# Patient Record
Sex: FEMALE | Race: WHITE | HISPANIC OR LATINO | URBAN - METROPOLITAN AREA
[De-identification: names, ages, dates, MRNs, and addresses within clinical notes are randomized per-mention and may not be internally consistent; named-entity substitution may affect disease eponyms.]

---

## 2017-09-17 ENCOUNTER — EMERGENCY (EMERGENCY)
Facility: HOSPITAL | Age: 42
LOS: 1 days | Discharge: HOME | End: 2017-09-17
Attending: EMERGENCY MEDICINE | Admitting: EMERGENCY MEDICINE
Payer: MEDICAID

## 2017-09-17 VITALS
HEART RATE: 87 BPM | OXYGEN SATURATION: 100 % | SYSTOLIC BLOOD PRESSURE: 136 MMHG | TEMPERATURE: 98 F | DIASTOLIC BLOOD PRESSURE: 91 MMHG | RESPIRATION RATE: 18 BRPM

## 2017-09-17 PROCEDURE — 99284 EMERGENCY DEPT VISIT MOD MDM: CPT | Mod: 25

## 2017-09-17 NOTE — ED ADULT TRIAGE NOTE - CHIEF COMPLAINT QUOTE
Pt c/o sharp intermittent ear pain in Left ear, x 3 days, tender and slightly swollen behind ear and along throat;  afebrile, denies sore throat, no recent dental pain; no recent illness; pt decided to start taking some old abx from home on Friday - she believes they were Amoxicillin,

## 2017-09-18 LAB
ALBUMIN SERPL ELPH-MCNC: 4.3 G/DL — SIGNIFICANT CHANGE UP (ref 3.3–5)
ALP SERPL-CCNC: 54 U/L — SIGNIFICANT CHANGE UP (ref 40–120)
ALT FLD-CCNC: 41 U/L — HIGH (ref 4–33)
AST SERPL-CCNC: 38 U/L — HIGH (ref 4–32)
BASOPHILS # BLD AUTO: 0.05 K/UL — SIGNIFICANT CHANGE UP (ref 0–0.2)
BASOPHILS NFR BLD AUTO: 0.5 % — SIGNIFICANT CHANGE UP (ref 0–2)
BILIRUB SERPL-MCNC: 0.2 MG/DL — SIGNIFICANT CHANGE UP (ref 0.2–1.2)
BUN SERPL-MCNC: 19 MG/DL — SIGNIFICANT CHANGE UP (ref 7–23)
CALCIUM SERPL-MCNC: 9.2 MG/DL — SIGNIFICANT CHANGE UP (ref 8.4–10.5)
CHLORIDE SERPL-SCNC: 100 MMOL/L — SIGNIFICANT CHANGE UP (ref 98–107)
CO2 SERPL-SCNC: 27 MMOL/L — SIGNIFICANT CHANGE UP (ref 22–31)
CREAT SERPL-MCNC: 0.86 MG/DL — SIGNIFICANT CHANGE UP (ref 0.5–1.3)
EOSINOPHIL # BLD AUTO: 0.22 K/UL — SIGNIFICANT CHANGE UP (ref 0–0.5)
EOSINOPHIL NFR BLD AUTO: 2.1 % — SIGNIFICANT CHANGE UP (ref 0–6)
GLUCOSE SERPL-MCNC: 128 MG/DL — HIGH (ref 70–99)
HCT VFR BLD CALC: 44.7 % — SIGNIFICANT CHANGE UP (ref 34.5–45)
HGB BLD-MCNC: 14.9 G/DL — SIGNIFICANT CHANGE UP (ref 11.5–15.5)
IMM GRANULOCYTES # BLD AUTO: 0.02 # — SIGNIFICANT CHANGE UP
IMM GRANULOCYTES NFR BLD AUTO: 0.2 % — SIGNIFICANT CHANGE UP (ref 0–1.5)
LYMPHOCYTES # BLD AUTO: 3.57 K/UL — HIGH (ref 1–3.3)
LYMPHOCYTES # BLD AUTO: 34.6 % — SIGNIFICANT CHANGE UP (ref 13–44)
MCHC RBC-ENTMCNC: 30.2 PG — SIGNIFICANT CHANGE UP (ref 27–34)
MCHC RBC-ENTMCNC: 33.3 % — SIGNIFICANT CHANGE UP (ref 32–36)
MCV RBC AUTO: 90.5 FL — SIGNIFICANT CHANGE UP (ref 80–100)
MONOCYTES # BLD AUTO: 0.92 K/UL — HIGH (ref 0–0.9)
MONOCYTES NFR BLD AUTO: 8.9 % — SIGNIFICANT CHANGE UP (ref 2–14)
NEUTROPHILS # BLD AUTO: 5.54 K/UL — SIGNIFICANT CHANGE UP (ref 1.8–7.4)
NEUTROPHILS NFR BLD AUTO: 53.7 % — SIGNIFICANT CHANGE UP (ref 43–77)
NRBC # FLD: 0 — SIGNIFICANT CHANGE UP
PLATELET # BLD AUTO: 266 K/UL — SIGNIFICANT CHANGE UP (ref 150–400)
PMV BLD: 11.2 FL — SIGNIFICANT CHANGE UP (ref 7–13)
POTASSIUM SERPL-MCNC: 4.5 MMOL/L — SIGNIFICANT CHANGE UP (ref 3.5–5.3)
POTASSIUM SERPL-SCNC: 4.5 MMOL/L — SIGNIFICANT CHANGE UP (ref 3.5–5.3)
PROT SERPL-MCNC: 8.3 G/DL — SIGNIFICANT CHANGE UP (ref 6–8.3)
RBC # BLD: 4.94 M/UL — SIGNIFICANT CHANGE UP (ref 3.8–5.2)
RBC # FLD: 12.1 % — SIGNIFICANT CHANGE UP (ref 10.3–14.5)
SODIUM SERPL-SCNC: 141 MMOL/L — SIGNIFICANT CHANGE UP (ref 135–145)
WBC # BLD: 10.32 K/UL — SIGNIFICANT CHANGE UP (ref 3.8–10.5)
WBC # FLD AUTO: 10.32 K/UL — SIGNIFICANT CHANGE UP (ref 3.8–10.5)

## 2017-09-18 RX ORDER — CIPROFLOXACIN AND DEXAMETHASONE 3; 1 MG/ML; MG/ML
4 SUSPENSION/ DROPS AURICULAR (OTIC) ONCE
Qty: 0 | Refills: 0 | Status: DISCONTINUED | OUTPATIENT
Start: 2017-09-18 | End: 2017-09-21

## 2017-09-18 NOTE — ED PROVIDER NOTE - OBJECTIVE STATEMENT
41 y/o female with no significant PMHx presents to the ER c/o left inner ear pain and posterior left ear pain x3days. Pt states sx have been worsening over the past few days. Pt reports decreased hearing to the left ear. Pt denies fever, chills, nausea, vomiting, discharge, trauma. Pt offered and declined translation services, requesting her son Domingo Alexandre. 43 y/o female with no significant PMHx presents to the ER c/o left outer and posterior ear pain x3days. Pt states sx have been worsening over the past few days. Pt reports decreased hearing to the left ear. Pt denies fever, chills, nausea, vomiting, discharge, trauma. Pt offered and declined translation services, requesting her son Domingo Alexandre to translate. 41 y/o female with no significant PMHx presents to the ER c/o left outer and posterior ear pain x3days. Pt states sx have been worsening over the past few days. Pt reports decreased hearing to the left ear. Pt denies fever, chills, nausea, vomiting, discharge, trauma. Pt offered and declined translation services, requesting her son Domingo Alexandre to translate.    Arlene att: 42F neg pmh c/o left ear pain x 3 days. Patient declined [place]  x 2, request son translate. Past 3 days patient developed worsening ear pain, points to left ear, associated decr hearing. Denies f, c, dm, n, v, discharge, trauma, recent abx.

## 2017-09-18 NOTE — ED PROVIDER NOTE - CHPI ED SYMPTOMS POS
left inner ear pain, posterior left ear pain, decreased hearing/EAR PAIN left outer ear pain, posterior ear pain, decreased hearing/EAR PAIN

## 2017-09-18 NOTE — ED PROVIDER NOTE - ATTENDING CONTRIBUTION TO CARE
Dr. Jacobs: I performed a face to face bedside interview with patient regarding history of present illness, review of symptoms and past medical history. I completed an independent physical exam.  I have discussed patient's plan of care with PA.   I agree with note as stated above, having amended the EMR as needed to reflect my findings.   This includes HISTORY OF PRESENT ILLNESS, HIV, PAST MEDICAL/SURGICAL/FAMILY/SOCIAL HISTORY, ALLERGIES AND HOME MEDICATIONS, REVIEW OF SYSTEMS, PHYSICAL EXAM, and any PROGRESS NOTES during the time I functioned as the attending physician for this patient. HPI above as by me. PE above as by me. DDX otitis externa PLAN ciprodex and ent f/up outpatient.

## 2017-09-18 NOTE — ED PROVIDER NOTE - LEFT EAR
swelling of the external auricular canal, tenderness over the mastoid, TM is clear/TM clear swelling of the external auricular canal, TM is clear. +pre and post articular lymph nodes, non tender over mastoid./TM clear

## 2017-09-18 NOTE — ED PROVIDER NOTE - CROS ED ENMT EARS POS
left inner/posterior ear pain, decreased hearing to the left ear left outer/posterior ear pain, decreased hearing to the left ear

## 2017-09-18 NOTE — ED PROVIDER NOTE - PSH
S/P breast augmentation    S/P breast biopsy, left  cyst  S/P hemorrhoidectomy    S/P hysterectomy  2012 secondary to fibroids  S/P tonsillectomy

## 2017-09-18 NOTE — ED PROVIDER NOTE - MEDICAL DECISION MAKING DETAILS
43 y/o female presents with x3days of left ear pain with tenderness over the mastoid. Plan - will check labs, ct to r/o mastoiditis. 41 y/o female presents with x3days of left ear pain with tenderness over the mastoid. Plan - will check labs, likely otitis externa, Ciprodex, follow up PMD. 43 y/o female presents with 3 days of left ear pain.  Plan - will check labs, likely otitis externa, Ciprodex, follow up PMD.

## 2017-09-18 NOTE — ED PROVIDER NOTE - PHYSICAL EXAMINATION
Arlene att: nad, aaox3, lt ear erythematous canal, pulling of pinna elicits pain, neg internal debris, TM intact without bulging, ctabl, rrr, s1s2, abd soft ntnd, neg le edema

## 2017-09-18 NOTE — ED PROVIDER NOTE - PLAN OF CARE
Follow up with your Doctor in 1-2 days.  Ciprodex Otic Drops 4 drops to left ear 2 x a day x 7 days.  Take Motrin 400mg orally every 6 hours as needed for pain take with food.  Return to the ER for any persistent/worsening or new symptoms fevers, chills, increased pain or any concerning symptoms.

## 2017-09-18 NOTE — ED PROVIDER NOTE - CARE PLAN
Principal Discharge DX:	Otitis externa  Instructions for follow-up, activity and diet:	Follow up with your Doctor in 1-2 days.  Ciprodex Otic Drops 4 drops to left ear 2 x a day x 7 days.  Take Motrin 400mg orally every 6 hours as needed for pain take with food.  Return to the ER for any persistent/worsening or new symptoms fevers, chills, increased pain or any concerning symptoms.

## 2017-09-19 ENCOUNTER — EMERGENCY (EMERGENCY)
Facility: HOSPITAL | Age: 42
LOS: 1 days | Discharge: HOME | End: 2017-09-19
Attending: EMERGENCY MEDICINE | Admitting: EMERGENCY MEDICINE
Payer: MEDICAID

## 2017-09-19 VITALS
TEMPERATURE: 98 F | RESPIRATION RATE: 20 BRPM | HEART RATE: 84 BPM | OXYGEN SATURATION: 100 % | SYSTOLIC BLOOD PRESSURE: 148 MMHG | DIASTOLIC BLOOD PRESSURE: 83 MMHG

## 2017-09-19 DIAGNOSIS — H60.312 DIFFUSE OTITIS EXTERNA, LEFT EAR: ICD-10-CM

## 2017-09-19 LAB
ALBUMIN SERPL ELPH-MCNC: 4.4 G/DL — SIGNIFICANT CHANGE UP (ref 3.3–5)
ALP SERPL-CCNC: 51 U/L — SIGNIFICANT CHANGE UP (ref 40–120)
ALT FLD-CCNC: 32 U/L — SIGNIFICANT CHANGE UP (ref 4–33)
AST SERPL-CCNC: 18 U/L — SIGNIFICANT CHANGE UP (ref 4–32)
BASOPHILS # BLD AUTO: 0.05 K/UL — SIGNIFICANT CHANGE UP (ref 0–0.2)
BASOPHILS NFR BLD AUTO: 0.5 % — SIGNIFICANT CHANGE UP (ref 0–2)
BILIRUB SERPL-MCNC: 0.4 MG/DL — SIGNIFICANT CHANGE UP (ref 0.2–1.2)
BUN SERPL-MCNC: 12 MG/DL — SIGNIFICANT CHANGE UP (ref 7–23)
CALCIUM SERPL-MCNC: 9.2 MG/DL — SIGNIFICANT CHANGE UP (ref 8.4–10.5)
CHLORIDE SERPL-SCNC: 101 MMOL/L — SIGNIFICANT CHANGE UP (ref 98–107)
CO2 SERPL-SCNC: 28 MMOL/L — SIGNIFICANT CHANGE UP (ref 22–31)
CREAT SERPL-MCNC: 0.66 MG/DL — SIGNIFICANT CHANGE UP (ref 0.5–1.3)
EOSINOPHIL # BLD AUTO: 0.17 K/UL — SIGNIFICANT CHANGE UP (ref 0–0.5)
EOSINOPHIL NFR BLD AUTO: 1.5 % — SIGNIFICANT CHANGE UP (ref 0–6)
GLUCOSE SERPL-MCNC: 120 MG/DL — HIGH (ref 70–99)
HCG SERPL-ACNC: < 5 MIU/ML — SIGNIFICANT CHANGE UP
HCT VFR BLD CALC: 44.1 % — SIGNIFICANT CHANGE UP (ref 34.5–45)
HGB BLD-MCNC: 14.8 G/DL — SIGNIFICANT CHANGE UP (ref 11.5–15.5)
IMM GRANULOCYTES # BLD AUTO: 0.05 # — SIGNIFICANT CHANGE UP
IMM GRANULOCYTES NFR BLD AUTO: 0.5 % — SIGNIFICANT CHANGE UP (ref 0–1.5)
LYMPHOCYTES # BLD AUTO: 28.3 % — SIGNIFICANT CHANGE UP (ref 13–44)
LYMPHOCYTES # BLD AUTO: 3.12 K/UL — SIGNIFICANT CHANGE UP (ref 1–3.3)
MCHC RBC-ENTMCNC: 29.8 PG — SIGNIFICANT CHANGE UP (ref 27–34)
MCHC RBC-ENTMCNC: 33.6 % — SIGNIFICANT CHANGE UP (ref 32–36)
MCV RBC AUTO: 88.7 FL — SIGNIFICANT CHANGE UP (ref 80–100)
MONOCYTES # BLD AUTO: 0.88 K/UL — SIGNIFICANT CHANGE UP (ref 0–0.9)
MONOCYTES NFR BLD AUTO: 8 % — SIGNIFICANT CHANGE UP (ref 2–14)
NEUTROPHILS # BLD AUTO: 6.76 K/UL — SIGNIFICANT CHANGE UP (ref 1.8–7.4)
NEUTROPHILS NFR BLD AUTO: 61.2 % — SIGNIFICANT CHANGE UP (ref 43–77)
NRBC # FLD: 0 — SIGNIFICANT CHANGE UP
PLATELET # BLD AUTO: 314 K/UL — SIGNIFICANT CHANGE UP (ref 150–400)
PMV BLD: 10.8 FL — SIGNIFICANT CHANGE UP (ref 7–13)
POTASSIUM SERPL-MCNC: 4.2 MMOL/L — SIGNIFICANT CHANGE UP (ref 3.5–5.3)
POTASSIUM SERPL-SCNC: 4.2 MMOL/L — SIGNIFICANT CHANGE UP (ref 3.5–5.3)
PROT SERPL-MCNC: 8.2 G/DL — SIGNIFICANT CHANGE UP (ref 6–8.3)
RBC # BLD: 4.97 M/UL — SIGNIFICANT CHANGE UP (ref 3.8–5.2)
RBC # FLD: 12 % — SIGNIFICANT CHANGE UP (ref 10.3–14.5)
SODIUM SERPL-SCNC: 141 MMOL/L — SIGNIFICANT CHANGE UP (ref 135–145)
WBC # BLD: 11.03 K/UL — HIGH (ref 3.8–10.5)
WBC # FLD AUTO: 11.03 K/UL — HIGH (ref 3.8–10.5)

## 2017-09-19 PROCEDURE — 99220: CPT

## 2017-09-19 PROCEDURE — 70487 CT MAXILLOFACIAL W/DYE: CPT | Mod: 26

## 2017-09-19 RX ORDER — PIPERACILLIN AND TAZOBACTAM 4; .5 G/20ML; G/20ML
3.38 INJECTION, POWDER, LYOPHILIZED, FOR SOLUTION INTRAVENOUS ONCE
Qty: 0 | Refills: 0 | Status: COMPLETED | OUTPATIENT
Start: 2017-09-19 | End: 2017-09-19

## 2017-09-19 RX ORDER — SODIUM CHLORIDE 9 MG/ML
1000 INJECTION INTRAMUSCULAR; INTRAVENOUS; SUBCUTANEOUS ONCE
Qty: 0 | Refills: 0 | Status: COMPLETED | OUTPATIENT
Start: 2017-09-19 | End: 2017-09-19

## 2017-09-19 RX ORDER — IBUPROFEN 200 MG
600 TABLET ORAL ONCE
Qty: 0 | Refills: 0 | Status: COMPLETED | OUTPATIENT
Start: 2017-09-19 | End: 2017-09-19

## 2017-09-19 RX ORDER — MORPHINE SULFATE 50 MG/1
4 CAPSULE, EXTENDED RELEASE ORAL ONCE
Qty: 0 | Refills: 0 | Status: DISCONTINUED | OUTPATIENT
Start: 2017-09-19 | End: 2017-09-19

## 2017-09-19 RX ORDER — VANCOMYCIN HCL 1 G
1000 VIAL (EA) INTRAVENOUS ONCE
Qty: 0 | Refills: 0 | Status: COMPLETED | OUTPATIENT
Start: 2017-09-19 | End: 2017-09-19

## 2017-09-19 RX ORDER — CIPROFLOXACIN AND DEXAMETHASONE 3; 1 MG/ML; MG/ML
4 SUSPENSION/ DROPS AURICULAR (OTIC) ONCE
Qty: 0 | Refills: 0 | Status: COMPLETED | OUTPATIENT
Start: 2017-09-19 | End: 2017-09-19

## 2017-09-19 RX ORDER — AMPICILLIN SODIUM AND SULBACTAM SODIUM 250; 125 MG/ML; MG/ML
3 INJECTION, POWDER, FOR SUSPENSION INTRAMUSCULAR; INTRAVENOUS ONCE
Qty: 0 | Refills: 0 | Status: COMPLETED | OUTPATIENT
Start: 2017-09-19 | End: 2017-09-19

## 2017-09-19 RX ORDER — AMPICILLIN SODIUM AND SULBACTAM SODIUM 250; 125 MG/ML; MG/ML
3 INJECTION, POWDER, FOR SUSPENSION INTRAMUSCULAR; INTRAVENOUS EVERY 6 HOURS
Qty: 0 | Refills: 0 | Status: DISCONTINUED | OUTPATIENT
Start: 2017-09-19 | End: 2017-09-23

## 2017-09-19 RX ADMIN — Medication 250 MILLIGRAM(S): at 13:04

## 2017-09-19 RX ADMIN — PIPERACILLIN AND TAZOBACTAM 200 GRAM(S): 4; .5 INJECTION, POWDER, LYOPHILIZED, FOR SOLUTION INTRAVENOUS at 12:13

## 2017-09-19 RX ADMIN — AMPICILLIN SODIUM AND SULBACTAM SODIUM 200 GRAM(S): 250; 125 INJECTION, POWDER, FOR SUSPENSION INTRAMUSCULAR; INTRAVENOUS at 07:40

## 2017-09-19 RX ADMIN — CIPROFLOXACIN AND DEXAMETHASONE 4 DROP(S): 3; 1 SUSPENSION/ DROPS AURICULAR (OTIC) at 07:40

## 2017-09-19 RX ADMIN — MORPHINE SULFATE 4 MILLIGRAM(S): 50 CAPSULE, EXTENDED RELEASE ORAL at 20:10

## 2017-09-19 RX ADMIN — SODIUM CHLORIDE 1000 MILLILITER(S): 9 INJECTION INTRAMUSCULAR; INTRAVENOUS; SUBCUTANEOUS at 07:40

## 2017-09-19 RX ADMIN — MORPHINE SULFATE 4 MILLIGRAM(S): 50 CAPSULE, EXTENDED RELEASE ORAL at 19:54

## 2017-09-19 RX ADMIN — Medication 600 MILLIGRAM(S): at 17:40

## 2017-09-19 RX ADMIN — Medication 600 MILLIGRAM(S): at 17:10

## 2017-09-19 RX ADMIN — AMPICILLIN SODIUM AND SULBACTAM SODIUM 200 GRAM(S): 250; 125 INJECTION, POWDER, FOR SUSPENSION INTRAMUSCULAR; INTRAVENOUS at 22:34

## 2017-09-19 NOTE — ED PROVIDER NOTE - ATTENDING CONTRIBUTION TO CARE
Elías GAMBLE- 43 Y/O F with no known medical problems p/w left ear pain and swelling with now decreased hearing for 1 day, pt was seen in ED few days ago and diagnosed with otitis externa and putting ear drops but getting worse, no fever, chills, nausea, vomiting , headache or tinnitus    pt is alert, well appearing female, Japanese speaking, son translating, s1s2 normal reg, b/l clear breath sounds, abd soft, nt, nd, neuro exam aox3 sub decreased hearing in left ear but has hearing capacity, left tm is dull with swollen external canal and mastoid tenderness, rt ear exam normal, skin warm, dry, good turgor    plan to r/o mastoiditis, will treat with unasyn, cipro dex drops , do ct maxillofacial with iv contrast

## 2017-09-19 NOTE — ED CDU PROVIDER NOTE - PROGRESS NOTE DETAILS
CDU THERESA GARCIA: pt examined at bedside, getting abx for mastoiditis, c/o 6/10 pain, will give her morphine, continue abx, have ent reasses in the AM, reasses her pain soon CDU Attending Dr. Harrell Discharge Note: pt signed out to me at 0700 today, 9/20/17; pt is a 41 yo female with left otomastoiditis and left otitis externa, placed in CDU for IV abx and ENT re-evaluation; on my evaluation pt noting feeling improvement, in NAD; as per ENT pt stable for outpatient abx at this time; pt stable for discharge from CDU with outpatient follow up THERESA Mendoza: Pt feeling better. ent called this morning. Pt stable for discharge, with rx for augmentin and ciprodex drops. Pt to follow up with ENT clinic at . Pt agrees with plan.

## 2017-09-19 NOTE — ED CDU PROVIDER NOTE - OBJECTIVE STATEMENT
# [id number]   41 y/o female no pmh, seen here few days ago for left ear pain, was given cipro ear drops now here c/o worsening ear pain, and swelling behind her ear,+ difficulty hearing, denies any headaches, f/c/n/v/d, tinnitus, chest pain, sob, abdomianl pain, urinary symptoms, numbness/weaknesss/tingling, recent travel, sick contact, social history    CDU THERESA Mendoza: Daughter at bedside translating, Pt refusing translation service at this time.   42 yr old female with hx of asthma presents c/o left ear pain. Pt was seen in ED few days ago and given ear drops. Pt presents today for worsening ear pain and swelling behind ear with difficulty hearing. Denies any fever, chills or any other symptoms.   CT showing left mastoiditis/ malignant otitis externa. ENT seen pt. Pt in cdu for IV abx and ENT will follow.

## 2017-09-19 NOTE — ED PROVIDER NOTE - OBJECTIVE STATEMENT
# [id number]   43 y/o female no pmh, seen here few days ago for left ear pain, was given cipro ear drops now here c/o worsening ear pain, and swelling behind her ear,+ difficulty hearing, denies any headaches, f/c/n/v/d, tinnitus, chest pain, sob, abdomianl pain, urinary symptoms, numbness/weaknesss/tingling, recent travel, sick contact, social history

## 2017-09-19 NOTE — ED ADULT NURSE NOTE - OBJECTIVE STATEMENT
Patient a&ox4, ambulatory, arrived to ed intake room 8. Patient states L sided ear pain, facial swelling,& decreased hearing. Patient recently prescribed antibiotic ear drops Sunday, states compliant with medication regimen and taking ibuprofen with pain but feels s.s are worsening. Patient states moderate pain, worse severe when laying flat. Patient evaluated by ED provider. IV placed, labs drawn and sent to lab. TO be medicated as ordered. Pending CT scan. Safety and comfort maintained. Son at bedside. Pt able to make needs known.

## 2017-09-19 NOTE — PROGRESS NOTE ADULT - SUBJECTIVE AND OBJECTIVE BOX
42 year old female that presents to the ED c/o L ear pain for about one   week. Was started on Ciprodex drops without relief. Presents to day stating the   pain now radiates to jaw and neck. Denies any fevers, chills, N/V, sweats or any   other complaints.     AVSS, P02 98%  HEENT:  L Ear: + tenderness to palpation, EAC enlarged. Canal   obstructed and tender. + erythema. No exudate.   R ear: no erthema, No edema, No exudates.  TM intact.     Placed small wick L EAC.    CT  IMPRESSION:  Left otomastoiditis.  Left otitis externa with surrounding soft tissue inflammatory change  consistent with malignant otitis externa.      Results were discussed with JAYLENE Tucker by Dr. Johnson at 10:49 AM,  9/19/2017 with read back followed.

## 2017-09-19 NOTE — ED ADULT TRIAGE NOTE - CHIEF COMPLAINT QUOTE
Pt. presents to the ED with Left ear pain. Was seen on Sunday in the ED and was prescribed ear drops. c/o ear pain persisting. Pt. is Croatian speaking and is consenting to having son translate but would like translation services for medical evaluation. Pt. appears comfortable at triage.

## 2017-09-19 NOTE — ED CDU PROVIDER NOTE - LEFT EAR
+ mastoid swelling, tenderness, limited internal exam due to wick in place- placed by ENT/EXTERNAL CANAL INFLAMMATION

## 2017-09-19 NOTE — PROGRESS NOTE ADULT - PROBLEM SELECTOR PLAN 1
1. Continue with ciprodex drops to wick  2. Start oral augmentin 875 mg bid x 7 days  3. Pain control per ED.  4. Return to ED if symptoms worsen  5. [facility] ENT resident who will [facility] ENT attending. CT was reviewed by resident and after discussing case resident gave clearance for discharge with above plan and out patient follow up ENT clinic

## 2017-09-19 NOTE — ED ADULT NURSE NOTE - CHIEF COMPLAINT QUOTE
Pt. presents to the ED with Left ear pain. Was seen on Sunday in the ED and was prescribed ear drops. c/o ear pain persisting. Pt. is Pakistani speaking and is consenting to having son translate but would like translation services for medical evaluation. Pt. appears comfortable at triage.

## 2017-09-19 NOTE — ED PROVIDER NOTE - PROGRESS NOTE DETAILS
ATTG NOTE DR. CARLSON patient evaluated, sitting comfortable, NAD, +tenderness to left sided SCM posterior belly, ENT called, CT pending results.

## 2017-09-19 NOTE — ED CDU PROVIDER NOTE - ATTENDING CONTRIBUTION TO CARE
Dr. Delgadillo: JAIDEN HINKLE MD: 41 yo F with a past medical history of asthma presents c/o LEFT ear and facial pain and swelling.  Pt was recently evaluated in the ED and d/c'ed on ear drops. Patient states  today worsening ear pain and swelling with decreased hearing. Denies any F/C or any other symptoms.   CT performed in the ED showed left mastoiditis/malignant OE.   JAIDEN REYNOSO, ATTENDING NOTE:  Patient is awake and alert and in no acute distress.  LEFT sided auricular swelling and tenderness to palpation with associated mastoid tenderness to palpation.  Airway patent.  No stridor, no oropharyngeal edema.  Neck supple.  Lungs CTAB, no wheeze, no rhonchi,  no rales.  Heart is regular rate and rhythm.  Abdomen is soft, not distended +BS.  Back is nontender, no CVAT.  Moving all 4 extremities.   Neurologically grossly intact.  Affect is appropriate.   DR. REYNOSO, ATTENDING MD-  I performed a face to face bedside interview with patient regarding history of present illness, review of symptoms and past medical history. I completed an independent physical exam.  I have discussed patient's plan of care with the PA.   I agree with note as stated above, having amended the EMR as needed to reflect my findings. I have discussed the assessment and plan of care.  This includes during the time I functioned as the attending physician for this patient.

## 2017-09-20 VITALS
HEART RATE: 82 BPM | RESPIRATION RATE: 16 BRPM | DIASTOLIC BLOOD PRESSURE: 70 MMHG | TEMPERATURE: 98 F | SYSTOLIC BLOOD PRESSURE: 110 MMHG | OXYGEN SATURATION: 98 %

## 2017-09-20 PROCEDURE — 99217: CPT

## 2017-09-20 RX ORDER — CIPROFLOXACIN AND DEXAMETHASONE 3; 1 MG/ML; MG/ML
4 SUSPENSION/ DROPS AURICULAR (OTIC)
Qty: 1 | Refills: 0 | OUTPATIENT
Start: 2017-09-20 | End: 2017-09-30

## 2017-09-20 RX ADMIN — AMPICILLIN SODIUM AND SULBACTAM SODIUM 200 GRAM(S): 250; 125 INJECTION, POWDER, FOR SUSPENSION INTRAMUSCULAR; INTRAVENOUS at 04:40

## 2017-10-26 ENCOUNTER — APPOINTMENT (OUTPATIENT)
Dept: OTOLARYNGOLOGY | Facility: CLINIC | Age: 42
End: 2017-10-26

## 2018-01-29 ENCOUNTER — EMERGENCY (EMERGENCY)
Facility: HOSPITAL | Age: 43
LOS: 1 days | Discharge: HOME | End: 2018-01-29
Attending: EMERGENCY MEDICINE | Admitting: EMERGENCY MEDICINE
Payer: MEDICAID

## 2018-01-29 VITALS
SYSTOLIC BLOOD PRESSURE: 145 MMHG | RESPIRATION RATE: 16 BRPM | TEMPERATURE: 98 F | DIASTOLIC BLOOD PRESSURE: 87 MMHG | OXYGEN SATURATION: 100 % | HEART RATE: 79 BPM

## 2018-01-29 VITALS
SYSTOLIC BLOOD PRESSURE: 150 MMHG | OXYGEN SATURATION: 100 % | HEART RATE: 75 BPM | RESPIRATION RATE: 16 BRPM | DIASTOLIC BLOOD PRESSURE: 95 MMHG | TEMPERATURE: 98 F

## 2018-01-29 LAB
ALBUMIN SERPL ELPH-MCNC: 4.4 G/DL — SIGNIFICANT CHANGE UP (ref 3.3–5)
ALP SERPL-CCNC: 48 U/L — SIGNIFICANT CHANGE UP (ref 40–120)
ALT FLD-CCNC: 36 U/L — HIGH (ref 4–33)
APPEARANCE UR: CLEAR — SIGNIFICANT CHANGE UP
APTT BLD: 27.8 SEC — SIGNIFICANT CHANGE UP (ref 27.5–37.4)
AST SERPL-CCNC: 29 U/L — SIGNIFICANT CHANGE UP (ref 4–32)
BACTERIA # UR AUTO: SIGNIFICANT CHANGE UP
BASOPHILS # BLD AUTO: 0.05 K/UL — SIGNIFICANT CHANGE UP (ref 0–0.2)
BASOPHILS NFR BLD AUTO: 0.5 % — SIGNIFICANT CHANGE UP (ref 0–2)
BILIRUB SERPL-MCNC: 0.2 MG/DL — SIGNIFICANT CHANGE UP (ref 0.2–1.2)
BILIRUB UR-MCNC: NEGATIVE — SIGNIFICANT CHANGE UP
BLD GP AB SCN SERPL QL: NEGATIVE — SIGNIFICANT CHANGE UP
BLOOD UR QL VISUAL: NEGATIVE — SIGNIFICANT CHANGE UP
BUN SERPL-MCNC: 17 MG/DL — SIGNIFICANT CHANGE UP (ref 7–23)
CALCIUM SERPL-MCNC: 9.5 MG/DL — SIGNIFICANT CHANGE UP (ref 8.4–10.5)
CHLORIDE SERPL-SCNC: 101 MMOL/L — SIGNIFICANT CHANGE UP (ref 98–107)
CO2 SERPL-SCNC: 28 MMOL/L — SIGNIFICANT CHANGE UP (ref 22–31)
COLOR SPEC: SIGNIFICANT CHANGE UP
CREAT SERPL-MCNC: 0.8 MG/DL — SIGNIFICANT CHANGE UP (ref 0.5–1.3)
EOSINOPHIL # BLD AUTO: 0.23 K/UL — SIGNIFICANT CHANGE UP (ref 0–0.5)
EOSINOPHIL NFR BLD AUTO: 2.5 % — SIGNIFICANT CHANGE UP (ref 0–6)
GLUCOSE SERPL-MCNC: 125 MG/DL — HIGH (ref 70–99)
GLUCOSE UR-MCNC: NEGATIVE — SIGNIFICANT CHANGE UP
HCG UR-SCNC: NEGATIVE — SIGNIFICANT CHANGE UP
HCT VFR BLD CALC: 45.5 % — HIGH (ref 34.5–45)
HGB BLD-MCNC: 15.5 G/DL — SIGNIFICANT CHANGE UP (ref 11.5–15.5)
IMM GRANULOCYTES # BLD AUTO: 0.01 # — SIGNIFICANT CHANGE UP
IMM GRANULOCYTES NFR BLD AUTO: 0.1 % — SIGNIFICANT CHANGE UP (ref 0–1.5)
INR BLD: 0.97 — SIGNIFICANT CHANGE UP (ref 0.88–1.17)
KETONES UR-MCNC: NEGATIVE — SIGNIFICANT CHANGE UP
LEUKOCYTE ESTERASE UR-ACNC: NEGATIVE — SIGNIFICANT CHANGE UP
LIDOCAIN IGE QN: 36.9 U/L — SIGNIFICANT CHANGE UP (ref 7–60)
LYMPHOCYTES # BLD AUTO: 3.56 K/UL — HIGH (ref 1–3.3)
LYMPHOCYTES # BLD AUTO: 38 % — SIGNIFICANT CHANGE UP (ref 13–44)
MCHC RBC-ENTMCNC: 30 PG — SIGNIFICANT CHANGE UP (ref 27–34)
MCHC RBC-ENTMCNC: 34.1 % — SIGNIFICANT CHANGE UP (ref 32–36)
MCV RBC AUTO: 88.2 FL — SIGNIFICANT CHANGE UP (ref 80–100)
MONOCYTES # BLD AUTO: 0.9 K/UL — SIGNIFICANT CHANGE UP (ref 0–0.9)
MONOCYTES NFR BLD AUTO: 9.6 % — SIGNIFICANT CHANGE UP (ref 2–14)
MUCOUS THREADS # UR AUTO: SIGNIFICANT CHANGE UP
NEUTROPHILS # BLD AUTO: 4.61 K/UL — SIGNIFICANT CHANGE UP (ref 1.8–7.4)
NEUTROPHILS NFR BLD AUTO: 49.3 % — SIGNIFICANT CHANGE UP (ref 43–77)
NITRITE UR-MCNC: NEGATIVE — SIGNIFICANT CHANGE UP
NRBC # FLD: 0 — SIGNIFICANT CHANGE UP
PH UR: 6.5 — SIGNIFICANT CHANGE UP (ref 4.6–8)
PLATELET # BLD AUTO: 322 K/UL — SIGNIFICANT CHANGE UP (ref 150–400)
PMV BLD: 10.8 FL — SIGNIFICANT CHANGE UP (ref 7–13)
POTASSIUM SERPL-MCNC: 5 MMOL/L — SIGNIFICANT CHANGE UP (ref 3.5–5.3)
POTASSIUM SERPL-SCNC: 5 MMOL/L — SIGNIFICANT CHANGE UP (ref 3.5–5.3)
PROT SERPL-MCNC: 8.1 G/DL — SIGNIFICANT CHANGE UP (ref 6–8.3)
PROT UR-MCNC: 10 MG/DL — SIGNIFICANT CHANGE UP
PROTHROM AB SERPL-ACNC: 11.2 SEC — SIGNIFICANT CHANGE UP (ref 9.8–13.1)
RBC # BLD: 5.16 M/UL — SIGNIFICANT CHANGE UP (ref 3.8–5.2)
RBC # FLD: 12 % — SIGNIFICANT CHANGE UP (ref 10.3–14.5)
RBC CASTS # UR COMP ASSIST: SIGNIFICANT CHANGE UP (ref 0–?)
RH IG SCN BLD-IMP: POSITIVE — SIGNIFICANT CHANGE UP
SODIUM SERPL-SCNC: 140 MMOL/L — SIGNIFICANT CHANGE UP (ref 135–145)
SP GR SPEC: 1.02 — SIGNIFICANT CHANGE UP (ref 1–1.04)
SQUAMOUS # UR AUTO: SIGNIFICANT CHANGE UP
UROBILINOGEN FLD QL: NORMAL MG/DL — SIGNIFICANT CHANGE UP
WBC # BLD: 9.36 K/UL — SIGNIFICANT CHANGE UP (ref 3.8–10.5)
WBC # FLD AUTO: 9.36 K/UL — SIGNIFICANT CHANGE UP (ref 3.8–10.5)
WBC UR QL: HIGH (ref 0–?)

## 2018-01-29 PROCEDURE — 76705 ECHO EXAM OF ABDOMEN: CPT | Mod: 26

## 2018-01-29 PROCEDURE — 99284 EMERGENCY DEPT VISIT MOD MDM: CPT | Mod: 25

## 2018-01-29 RX ORDER — SODIUM CHLORIDE 9 MG/ML
1000 INJECTION INTRAMUSCULAR; INTRAVENOUS; SUBCUTANEOUS ONCE
Qty: 0 | Refills: 0 | Status: COMPLETED | OUTPATIENT
Start: 2018-01-29 | End: 2018-01-29

## 2018-01-29 RX ORDER — ONDANSETRON 8 MG/1
4 TABLET, FILM COATED ORAL ONCE
Qty: 0 | Refills: 0 | Status: COMPLETED | OUTPATIENT
Start: 2018-01-29 | End: 2018-01-29

## 2018-01-29 RX ADMIN — SODIUM CHLORIDE 1000 MILLILITER(S): 9 INJECTION INTRAMUSCULAR; INTRAVENOUS; SUBCUTANEOUS at 04:30

## 2018-01-29 RX ADMIN — ONDANSETRON 4 MILLIGRAM(S): 8 TABLET, FILM COATED ORAL at 04:30

## 2018-01-29 NOTE — ED ADULT TRIAGE NOTE - CHIEF COMPLAINT QUOTE
Patient c/o upper right sided abdominal pain , back pain and abdominal distention that comes and goes.  H/o a hysterectomy in the past.

## 2018-01-29 NOTE — ED PROVIDER NOTE - NS ED ROS FT
CONSTITUTIONAL: No fevers, no chills  Eyes: no visual changes  Ears: no ear drainage, no ear pain  Nose: no nasal congestion  Mouth/Throat: no sore throat  Cardiovascular: No Chest pain  Respiratory: No SOB  Gastrointestinal: +n, +abd pain  Genitourinary: no dysuria, no hematuria  SKIN: no rashes.  NEURO: no headache  PSYCHIATRIC: no known mental health issues.

## 2018-01-29 NOTE — ED PROVIDER NOTE - PROGRESS NOTE DETAILS
Will d/c home. Pt tolerating PO, repeat sono right upper quadrant performed, pt has small mobile Gallstone. Will have pt follow up with Surgeon.

## 2018-01-29 NOTE — ED PROCEDURE NOTE - PROCEDURE ADDITIONAL DETAILS
Focused ED Ultrasound RUQ 25922  Grey scale RUQ views obtained in saggital and transverse planes.   No wall thickening, no gallbladder wall edema.  (+) pericholecystic fluid seen.  Positive sonographic tripathi's.  Small gallstone seen in body.    anterior gallbladder wall - 2.5mm  CBD - 1.7mm    Impression:  Abnormal gallbladder - possible acute cholecystitis.    Jeremy

## 2018-01-29 NOTE — ED ADULT NURSE NOTE - OBJECTIVE STATEMENT
Received pt in spot 17. [id number]. C/o RLQ pain x 2 days with nausea. Denies vomiting, denies fevers or chills. VS as noted. 20G IV placed to left AC, labs sent. Rpt given to covering WHITNEY Gardner. Will continue to monitor.

## 2018-01-29 NOTE — ED PROVIDER NOTE - OBJECTIVE STATEMENT
42 YOF pmh gastritis p/w 2 days of ruq abdominal pain radiating to right back intermittent worse after eating. Pt states she has had multiple prior episodes but they usually resolve on there own and have not occurred for 2 days. Pt denies any vomiting, +nausea. Pt also states her abdomen gets distended. Pt denies any chest pain, fever, chills, dysuria.

## 2018-01-29 NOTE — ED PROVIDER NOTE - ATTENDING CONTRIBUTION TO CARE
42F p/w abd pain, RUQ, crampy, intermittent, rad to back, worse after eating.  H/o gastritis but this is different. No fever or vomiting or diarrhea.  Reports some abd distension also, shows a pic with abd distension.  Passing gas.  On exam RUQ ttp, no distension - check labs, RUQ US, rx antacids and fluids, reassess.  RUQ US shows small mobile gallstone, moderate distension of GB.  Rads RUQ did not show the stone.  LFT acceptable, pt given PO challenge and RUQ rechecked, more normal size, rosalinda PO, OK to d/ c home f/u surgery as outpt.    VS:  unremarkable    GEN - NAD; malaise, nausea; A+O x3   HEAD - NC/AT     ENT - PEERL, EOMI, mucous membranes  dry , no discharge      NECK: Neck supple, non-tender without lymphadenopathy, no masses, no JVD  PULM - CTA b/l,  symmetric breath sounds  COR -  normal heart sounds    ABD - , ND, RUQ ttp, soft, no guarding, no rebound, no masses    BACK - no CVA tenderness, nontender spine     EXTREMS - no edema, no deformity, warm and well perfused    SKIN - no rash or bruising      NEUROLOGIC - alert, CN 2-12 intact, sensation nl, motor 5/5 RUE/LUE/RLE/LLE.

## 2018-01-29 NOTE — ED PROVIDER NOTE - MEDICAL DECISION MAKING DETAILS
right upper quadrant abdominal pain with nausea worse after meals will check for cholecystitis, pancreatitis.

## 2018-02-27 ENCOUNTER — APPOINTMENT (OUTPATIENT)
Dept: SURGERY | Facility: CLINIC | Age: 43
End: 2018-02-27
Payer: MEDICAID

## 2018-02-27 VITALS
DIASTOLIC BLOOD PRESSURE: 72 MMHG | BODY MASS INDEX: 29.27 KG/M2 | HEART RATE: 85 BPM | HEIGHT: 61 IN | SYSTOLIC BLOOD PRESSURE: 119 MMHG | WEIGHT: 155 LBS

## 2018-02-27 DIAGNOSIS — Z87.09 PERSONAL HISTORY OF OTHER DISEASES OF THE RESPIRATORY SYSTEM: ICD-10-CM

## 2018-02-27 DIAGNOSIS — Z87.19 PERSONAL HISTORY OF OTHER DISEASES OF THE DIGESTIVE SYSTEM: ICD-10-CM

## 2018-02-27 DIAGNOSIS — Z87.898 PERSONAL HISTORY OF OTHER SPECIFIED CONDITIONS: ICD-10-CM

## 2018-02-27 DIAGNOSIS — Z78.9 OTHER SPECIFIED HEALTH STATUS: ICD-10-CM

## 2018-02-27 DIAGNOSIS — Z86.69 PERSONAL HISTORY OF OTHER DISEASES OF THE NERVOUS SYSTEM AND SENSE ORGANS: ICD-10-CM

## 2018-02-27 PROCEDURE — 99203 OFFICE O/P NEW LOW 30 MIN: CPT

## 2018-02-28 PROBLEM — Z86.69 HISTORY OF EYE PROBLEM: Status: RESOLVED | Noted: 2018-02-28 | Resolved: 2018-02-28

## 2018-02-28 PROBLEM — Z87.898 HISTORY OF CHEST PAIN: Status: RESOLVED | Noted: 2018-02-28 | Resolved: 2018-02-28

## 2018-02-28 PROBLEM — Z87.19 HISTORY OF GASTRITIS: Status: RESOLVED | Noted: 2018-02-28 | Resolved: 2018-02-28

## 2018-02-28 PROBLEM — Z87.09 HISTORY OF ASTHMA: Status: RESOLVED | Noted: 2018-02-28 | Resolved: 2018-02-28

## 2018-07-28 PROBLEM — Z78.9 ALCOHOL USE: Status: INACTIVE | Noted: 2018-02-28
